# Patient Record
Sex: MALE | Race: BLACK OR AFRICAN AMERICAN | NOT HISPANIC OR LATINO | Employment: OTHER | URBAN - METROPOLITAN AREA
[De-identification: names, ages, dates, MRNs, and addresses within clinical notes are randomized per-mention and may not be internally consistent; named-entity substitution may affect disease eponyms.]

---

## 2023-09-07 ENCOUNTER — HOSPITAL ENCOUNTER (EMERGENCY)
Facility: HOSPITAL | Age: 78
Discharge: HOME | End: 2023-09-07
Attending: EMERGENCY MEDICINE
Payer: MEDICARE

## 2023-09-07 VITALS
SYSTOLIC BLOOD PRESSURE: 161 MMHG | HEIGHT: 69 IN | BODY MASS INDEX: 14.66 KG/M2 | RESPIRATION RATE: 18 BRPM | WEIGHT: 99 LBS | OXYGEN SATURATION: 94 % | HEART RATE: 64 BPM | DIASTOLIC BLOOD PRESSURE: 81 MMHG | TEMPERATURE: 97 F

## 2023-09-07 DIAGNOSIS — N39.0 URINARY TRACT INFECTION WITHOUT HEMATURIA, SITE UNSPECIFIED: Primary | ICD-10-CM

## 2023-09-07 LAB
APPEARANCE UR: CLEAR
BACTERIA #/AREA URNS AUTO: ABNORMAL /HPF
BILIRUB UR QL STRIP.AUTO: ABNORMAL
COLOR UR: YELLOW
GLUCOSE UR QL STRIP.AUTO: NEGATIVE
KETONES UR QL STRIP.AUTO: ABNORMAL
LEUKOCYTE ESTERASE UR QL STRIP.AUTO: ABNORMAL
MUCOUS THREADS URNS QL MICRO: ABNORMAL /LPF
NITRITE UR QL STRIP.AUTO: NEGATIVE
PH UR STRIP.AUTO: 5.5 [PH]
PROT UR QL STRIP.AUTO: ABNORMAL
RBC #/AREA URNS AUTO: ABNORMAL /HPF
RBC UR QL AUTO: NEGATIVE
SP GR UR STRIP.AUTO: 1.01 (ref 1–1.03)
SQUAMOUS #/AREA URNS AUTO: ABNORMAL /HPF
T VAGINALIS URNS QL MICRO: ABNORMAL /HPF
UROBILINOGEN UR STRIP-ACNC: 1
WBC #/AREA URNS AUTO: ABNORMAL /HPF

## 2023-09-07 PROCEDURE — 99283 EMERGENCY DEPT VISIT LOW MDM: CPT

## 2023-09-07 PROCEDURE — 81001 URINALYSIS AUTO W/SCOPE: CPT | Performed by: NURSE PRACTITIONER

## 2023-09-07 PROCEDURE — 87088 URINE BACTERIA CULTURE: CPT | Performed by: NURSE PRACTITIONER

## 2023-09-07 RX ORDER — NITROFURANTOIN 25; 75 MG/1; MG/1
100 CAPSULE ORAL 2 TIMES DAILY
Qty: 14 CAPSULE | Refills: 0 | Status: SHIPPED | OUTPATIENT
Start: 2023-09-07 | End: 2023-09-14

## 2023-09-07 NOTE — ED PROVIDER NOTES
Encounter Date: 9/7/2023       History     Chief Complaint   Patient presents with    Dysuria     Pt c/o dysuria x past few weeks, states was instructed to come to er per home health nurse.     Patient is a 77-year-old male presents emerged department complaints of burning with urination for the last week or so.  Denies any abdominal pain or back pain.  Denies any fevers chills.  Denies any nausea vomiting diarrhea.  Denies any symptoms of this in the past.  At this time is resting comfortably in results pending with no distress.  No other complaints or associated symptoms at this time.      Review of patient's allergies indicates:  No Known Allergies  No past medical history on file.  No past surgical history on file.  No family history on file.     Review of Systems   Constitutional:  Negative for activity change, appetite change and fever.   HENT:  Negative for congestion, dental problem and sore throat.    Eyes:  Negative for discharge.   Respiratory:  Negative for apnea, chest tightness and shortness of breath.    Cardiovascular:  Negative for chest pain.   Gastrointestinal:  Negative for abdominal distention, abdominal pain and nausea.   Genitourinary:  Positive for dysuria. Negative for decreased urine volume, difficulty urinating, testicular pain and urgency.   Musculoskeletal:  Negative for back pain.   Skin:  Negative for color change, rash and wound.   Neurological:  Negative for dizziness, facial asymmetry and weakness.   Hematological:  Does not bruise/bleed easily.   Psychiatric/Behavioral:  Negative for agitation and behavioral problems.    All other systems reviewed and are negative.      Physical Exam     Initial Vitals [09/07/23 1211]   BP Pulse Resp Temp SpO2   (!) 161/81 64 18 97.4 °F (36.3 °C) (!) 94 %      MAP       --         Physical Exam    Nursing note and vitals reviewed.  Constitutional: Vital signs are normal. He appears well-developed and well-nourished.  Non-toxic appearance. He does  not have a sickly appearance.   HENT:   Head: Normocephalic and atraumatic.   Right Ear: External ear normal.   Left Ear: External ear normal.   Eyes: Conjunctivae, EOM and lids are normal. Lids are everted and swept, no foreign bodies found.   Neck: Trachea normal and phonation normal. Neck supple. No thyroid mass and no thyromegaly present.   Normal range of motion.   Full passive range of motion without pain.     Cardiovascular:  Normal rate, regular rhythm, S1 normal, S2 normal, normal heart sounds, intact distal pulses and normal pulses.           Pulmonary/Chest: Breath sounds normal.   Abdominal: Abdomen is soft. There is no abdominal tenderness.   Musculoskeletal:      Cervical back: Full passive range of motion without pain, normal range of motion and neck supple.     Lymphadenopathy:     He has no cervical adenopathy.   Neurological: He is alert and oriented to person, place, and time. He has normal strength. GCS score is 15. GCS eye subscore is 4. GCS verbal subscore is 5. GCS motor subscore is 6.   Skin: Skin is warm, dry and intact. Capillary refill takes less than 2 seconds.   Psychiatric: He has a normal mood and affect. His speech is normal and behavior is normal. Judgment normal. Cognition and memory are normal.         ED Course   Procedures  Labs Reviewed   URINALYSIS, REFLEX TO URINE CULTURE - Abnormal; Notable for the following components:       Result Value    Protein, UA Trace (*)     Ketones, UA 1+ (*)     Bilirubin, UA 1+ (*)     Leukocyte Esterase, UA 1+ (*)     All other components within normal limits   URINALYSIS, MICROSCOPIC - Abnormal; Notable for the following components:    Mucous, UA Small (*)     Trichomonas, UA Few (*)     WBC, UA 11-20 (*)     Squamous Epithelial Cells, UA Few (*)     All other components within normal limits   CULTURE, URINE          Imaging Results    None          Medications - No data to display  Medical Decision Making  Patient is a 77-year-old male presents  emerged department complaints of burning with urination for the last week or so.  Denies any other complaints associated symptoms.    Problems Addressed:  Urinary tract infection without hematuria, site unspecified: acute illness or injury     Details: Urinalysis shows UTI here.  Discussed oral antibiotics on outpatient basis.  Discussed strict ED return precautions for any change or worsening symptoms in the next 2-3 days.  Patient had understanding of plan of care and had no other questions or concerns prior to discharge.    Amount and/or Complexity of Data Reviewed  External Data Reviewed: labs and notes.  Labs: ordered. Decision-making details documented in ED Course.    Risk  Prescription drug management.                               Clinical Impression:   Final diagnoses:  [N39.0] Urinary tract infection without hematuria, site unspecified (Primary)        ED Disposition Condition    Discharge Stable          ED Prescriptions       Medication Sig Dispense Start Date End Date Auth. Provider    nitrofurantoin, macrocrystal-monohydrate, (MACROBID) 100 MG capsule Take 1 capsule (100 mg total) by mouth 2 (two) times daily. for 7 days 14 capsule 9/7/2023 9/14/2023 Evan Beal FNP          Follow-up Information       Follow up With Specialties Details Why Contact Info    Jing Barlow FNP Family Medicine, Emergency Medicine Call  As needed, For ER Follow Up. [address] [place] [id number]  [phone]               Evan Beal FNP  09/07/23 3640       Evan Beal FNP  09/07/23 3057

## 2023-09-09 LAB — BACTERIA UR CULT: NORMAL

## 2023-09-18 ENCOUNTER — CLINICAL SUPPORT (OUTPATIENT)
Dept: RESPIRATORY THERAPY | Facility: HOSPITAL | Age: 78
End: 2023-09-18
Attending: NURSE PRACTITIONER
Payer: MEDICARE

## 2023-09-18 ENCOUNTER — HOSPITAL ENCOUNTER (OUTPATIENT)
Dept: RADIOLOGY | Facility: HOSPITAL | Age: 78
Discharge: HOME | End: 2023-09-18
Attending: NURSE PRACTITIONER
Payer: MEDICARE

## 2023-09-18 DIAGNOSIS — I10 ESSENTIAL HYPERTENSION, MALIGNANT: ICD-10-CM

## 2023-09-18 DIAGNOSIS — I10 ESSENTIAL HYPERTENSION, BENIGN: ICD-10-CM

## 2023-09-18 DIAGNOSIS — I10 ESSENTIAL HYPERTENSION, MALIGNANT: Primary | ICD-10-CM

## 2023-09-18 PROCEDURE — 93010 ELECTROCARDIOGRAM REPORT: CPT | Mod: ,,, | Performed by: STUDENT IN AN ORGANIZED HEALTH CARE EDUCATION/TRAINING PROGRAM

## 2023-09-18 PROCEDURE — 71046 X-RAY EXAM CHEST 2 VIEWS: CPT | Mod: TC

## 2023-09-18 PROCEDURE — 93010 EKG 12-LEAD: ICD-10-PCS | Mod: ,,, | Performed by: STUDENT IN AN ORGANIZED HEALTH CARE EDUCATION/TRAINING PROGRAM

## 2023-09-18 PROCEDURE — 93005 ELECTROCARDIOGRAM TRACING: CPT

## 2024-03-30 ENCOUNTER — HOSPITAL ENCOUNTER (EMERGENCY)
Facility: HOSPITAL | Age: 79
Discharge: HOME | End: 2024-03-30
Attending: INTERNAL MEDICINE
Payer: MEDICARE

## 2024-03-30 VITALS
WEIGHT: 98.88 LBS | BODY MASS INDEX: 14.6 KG/M2 | HEART RATE: 87 BPM | OXYGEN SATURATION: 98 % | RESPIRATION RATE: 15 BRPM | SYSTOLIC BLOOD PRESSURE: 103 MMHG | DIASTOLIC BLOOD PRESSURE: 76 MMHG

## 2024-03-30 DIAGNOSIS — F10.20 ALCOHOLISM: Primary | ICD-10-CM

## 2024-03-30 DIAGNOSIS — R79.89 ELEVATED LFTS: ICD-10-CM
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
## 2024-03-30 DIAGNOSIS — M54.9 BACK PAIN, UNSPECIFIED BACK LOCATION, UNSPECIFIED BACK PAIN LATERALITY, UNSPECIFIED CHRONICITY: ICD-10-CM

## 2024-03-30 DIAGNOSIS — E16.2 HYPOGLYCEMIA: ICD-10-CM

## 2024-03-30 DIAGNOSIS — R55 SYNCOPE: ICD-10-CM

## 2024-03-30 LAB
ALBUMIN SERPL-MCNC: 3.5 G/DL (ref 3.4–4.8)
ALBUMIN/GLOB SERPL: 0.9 RATIO (ref 1.1–2)
ALP SERPL-CCNC: 108 UNIT/L (ref 40–150)
ALT SERPL-CCNC: 350 UNIT/L (ref 0–55)
AMPHET UR QL SCN: NEGATIVE
APPEARANCE UR: CLEAR
APTT PPP: 31.1 SECONDS (ref 24.6–37.2)
AST SERPL-CCNC: 1278 UNIT/L (ref 5–34)
BACTERIA #/AREA URNS AUTO: ABNORMAL /HPF
BARBITURATE SCN PRESENT UR: NEGATIVE
BASOPHILS # BLD AUTO: 0.02 X10(3)/MCL
BASOPHILS NFR BLD AUTO: 0.2 %
BENZODIAZ UR QL SCN: NEGATIVE
BILIRUB SERPL-MCNC: 0.8 MG/DL
BILIRUB UR QL STRIP.AUTO: NEGATIVE
BNP BLD-MCNC: 201.1 PG/ML
BUN SERPL-MCNC: 29 MG/DL (ref 8.4–25.7)
CALCIUM SERPL-MCNC: 8.6 MG/DL (ref 8.8–10)
CANNABINOIDS UR QL SCN: NEGATIVE
CHLORIDE SERPL-SCNC: 105 MMOL/L (ref 98–107)
CK SERPL-CCNC: 163 U/L (ref 30–200)
CO2 SERPL-SCNC: 11 MMOL/L (ref 23–31)
COCAINE UR QL SCN: NEGATIVE
COLOR UR AUTO: YELLOW
CREAT SERPL-MCNC: 1.49 MG/DL (ref 0.73–1.18)
EOSINOPHIL # BLD AUTO: 0.03 X10(3)/MCL (ref 0–0.9)
EOSINOPHIL NFR BLD AUTO: 0.3 %
ERYTHROCYTE [DISTWIDTH] IN BLOOD BY AUTOMATED COUNT: 14.8 % (ref 11.5–17)
ETHANOL SERPL-MCNC: 32 MG/DL
FENTANYL UR QL SCN: NEGATIVE
GFR SERPLBLD CREATININE-BSD FMLA CKD-EPI: 48 MLS/MIN/1.73/M2
GLOBULIN SER-MCNC: 3.7 GM/DL (ref 2.4–3.5)
GLUCOSE SERPL-MCNC: 341 MG/DL (ref 82–115)
GLUCOSE UR QL STRIP.AUTO: ABNORMAL
HAV IGM SERPL QL IA: NONREACTIVE
HBV CORE IGM SERPL QL IA: NONREACTIVE
HBV SURFACE AG SERPL QL IA: NONREACTIVE
HCT VFR BLD AUTO: 35.1 % (ref 42–52)
HCV AB SERPL QL IA: NONREACTIVE
HGB BLD-MCNC: 10.7 G/DL (ref 14–18)
IMM GRANULOCYTES # BLD AUTO: 0.11 X10(3)/MCL (ref 0–0.04)
IMM GRANULOCYTES NFR BLD AUTO: 1.2 %
INR PPP: 1.2
KETONES UR QL STRIP.AUTO: ABNORMAL
LEUKOCYTE ESTERASE UR QL STRIP.AUTO: ABNORMAL
LIPASE SERPL-CCNC: 10 U/L
LYMPHOCYTES # BLD AUTO: 1.38 X10(3)/MCL (ref 0.6–4.6)
LYMPHOCYTES NFR BLD AUTO: 14.7 %
MCH RBC QN AUTO: 28.5 PG (ref 27–31)
MCHC RBC AUTO-ENTMCNC: 30.5 G/DL (ref 33–36)
MCV RBC AUTO: 93.6 FL (ref 80–94)
MDMA UR QL SCN: NEGATIVE
MONOCYTES # BLD AUTO: 0.46 X10(3)/MCL (ref 0.1–1.3)
MONOCYTES NFR BLD AUTO: 4.9 %
MUCOUS THREADS URNS QL MICRO: ABNORMAL /LPF
NEUTROPHILS # BLD AUTO: 7.39 X10(3)/MCL (ref 2.1–9.2)
NEUTROPHILS NFR BLD AUTO: 78.7 %
NITRITE UR QL STRIP.AUTO: NEGATIVE
OPIATES UR QL SCN: NEGATIVE
PCP UR QL: NEGATIVE
PH UR STRIP.AUTO: 5.5 [PH]
PH UR: 5.5 [PH] (ref 3–11)
PLATELET # BLD AUTO: 249 X10(3)/MCL (ref 130–400)
PMV BLD AUTO: 11.4 FL (ref 7.4–10.4)
POCT GLUCOSE: 21 MG/DL (ref 70–110)
POCT GLUCOSE: 357 MG/DL (ref 70–110)
POCT GLUCOSE: 373 MG/DL (ref 70–110)
POTASSIUM SERPL-SCNC: 4.5 MMOL/L (ref 3.5–5.1)
PROT SERPL-MCNC: 7.2 GM/DL (ref 5.8–7.6)
PROT UR QL STRIP.AUTO: ABNORMAL
PROTHROMBIN TIME: 15.5 SECONDS (ref 12.5–14.5)
RBC # BLD AUTO: 3.75 X10(6)/MCL (ref 4.7–6.1)
RBC #/AREA URNS AUTO: ABNORMAL /HPF
RBC UR QL AUTO: ABNORMAL
SODIUM SERPL-SCNC: 137 MMOL/L (ref 136–145)
SP GR UR STRIP.AUTO: 1.02 (ref 1–1.03)
SPECIFIC GRAVITY, URINE AUTO (.000) (OHS): 1.02 (ref 1–1.03)
SQUAMOUS #/AREA URNS AUTO: ABNORMAL /HPF
T VAGINALIS URNS QL MICRO: ABNORMAL /HPF
TROPONIN I SERPL-MCNC: <0.01 NG/ML (ref 0–0.04)
UROBILINOGEN UR STRIP-ACNC: 0.2
WBC # SPEC AUTO: 9.39 X10(3)/MCL (ref 4.5–11.5)
WBC #/AREA URNS AUTO: ABNORMAL /HPF

## 2024-03-30 PROCEDURE — 85610 PROTHROMBIN TIME: CPT | Performed by: INTERNAL MEDICINE

## 2024-03-30 PROCEDURE — 85730 THROMBOPLASTIN TIME PARTIAL: CPT | Performed by: INTERNAL MEDICINE

## 2024-03-30 PROCEDURE — 80074 ACUTE HEPATITIS PANEL: CPT | Performed by: INTERNAL MEDICINE

## 2024-03-30 PROCEDURE — 81001 URINALYSIS AUTO W/SCOPE: CPT | Performed by: INTERNAL MEDICINE

## 2024-03-30 PROCEDURE — 99285 EMERGENCY DEPT VISIT HI MDM: CPT | Mod: 25

## 2024-03-30 PROCEDURE — 82550 ASSAY OF CK (CPK): CPT | Performed by: INTERNAL MEDICINE

## 2024-03-30 PROCEDURE — 83690 ASSAY OF LIPASE: CPT | Performed by: INTERNAL MEDICINE

## 2024-03-30 PROCEDURE — 93010 ELECTROCARDIOGRAM REPORT: CPT | Mod: ,,, | Performed by: INTERNAL MEDICINE

## 2024-03-30 PROCEDURE — 82962 GLUCOSE BLOOD TEST: CPT

## 2024-03-30 PROCEDURE — 96365 THER/PROPH/DIAG IV INF INIT: CPT

## 2024-03-30 PROCEDURE — 87086 URINE CULTURE/COLONY COUNT: CPT | Performed by: INTERNAL MEDICINE

## 2024-03-30 PROCEDURE — 85025 COMPLETE CBC W/AUTO DIFF WBC: CPT | Performed by: INTERNAL MEDICINE

## 2024-03-30 PROCEDURE — 80053 COMPREHEN METABOLIC PANEL: CPT | Performed by: INTERNAL MEDICINE

## 2024-03-30 PROCEDURE — 25000003 PHARM REV CODE 250: Performed by: INTERNAL MEDICINE

## 2024-03-30 PROCEDURE — 82077 ASSAY SPEC XCP UR&BREATH IA: CPT | Performed by: INTERNAL MEDICINE

## 2024-03-30 PROCEDURE — 84484 ASSAY OF TROPONIN QUANT: CPT | Performed by: INTERNAL MEDICINE

## 2024-03-30 PROCEDURE — 80307 DRUG TEST PRSMV CHEM ANLYZR: CPT | Performed by: INTERNAL MEDICINE

## 2024-03-30 PROCEDURE — 93005 ELECTROCARDIOGRAM TRACING: CPT

## 2024-03-30 PROCEDURE — 83880 ASSAY OF NATRIURETIC PEPTIDE: CPT | Performed by: INTERNAL MEDICINE

## 2024-03-30 RX ORDER — NAPROXEN 375 MG/1
375 TABLET ORAL 2 TIMES DAILY WITH MEALS
Qty: 30 TABLET | Refills: 0 | Status: SHIPPED | OUTPATIENT
Start: 2024-03-30 | End: 2024-04-14

## 2024-03-30 RX ADMIN — DEXTROSE MONOHYDRATE 500 ML: 100 INJECTION, SOLUTION INTRAVENOUS at 11:03

## 2024-03-30 NOTE — ED PROVIDER NOTES
Encounter Date: 3/30/2024  History from patient and niece     History     Chief Complaint   Patient presents with    Fall     Pt found on bathroom floor after fall, cbg lo, pt possible on floor all night. Cbg<21 upon arrival, pt awake.     HPI    Joey Quintero is 78 y.o. male who  has a past medical history of Centrilobular emphysema, COPD (chronic obstructive pulmonary disease), Hypertension, and Pulmonary nodule, right (2019). arrives in ER with c/o Fall (Pt found on bathroom floor after fall, cbg lo, pt possible on floor all night. Cbg<21 upon arrival, pt awake.)    Review of patient's allergies indicates:  No Known Allergies  Past Medical History:   Diagnosis Date    Centrilobular emphysema     COPD (chronic obstructive pulmonary disease)     Hypertension     Pulmonary nodule, right 2019     History reviewed. No pertinent surgical history.  Family History   Problem Relation Age of Onset    Diabetes Mother     No Known Problems Father      Social History     Tobacco Use    Smoking status: Every Day     Types: Cigarettes    Smokeless tobacco: Never   Substance Use Topics    Alcohol use: Yes    Drug use: Never     Review of Systems   Unable to perform ROS: Other (Poor historian)   Constitutional:  Negative for fever.   Respiratory:  Negative for cough, chest tightness and shortness of breath.    Cardiovascular:  Negative for chest pain.   Gastrointestinal:  Negative for diarrhea and vomiting.   Musculoskeletal:  Positive for back pain.   Neurological:  Positive for syncope. Negative for dizziness.   Psychiatric/Behavioral: Negative.       Physical Exam     Initial Vitals   BP Pulse Resp Temp SpO2   03/30/24 1138 03/30/24 1138 03/30/24 1138 -- 03/30/24 1308   (!) 172/72 85 19  100 %      MAP       --                Physical Exam    Nursing note and vitals reviewed.  Constitutional:   Patient almost cachectic looking not in any distress lying down in the bed comfortably only complains of back pain   HENT:   Head:  Atraumatic.   Eyes: EOM are normal.   Neck: Neck supple.   Normal range of motion.  Cardiovascular:  Normal rate, normal heart sounds and intact distal pulses.           Pulmonary/Chest: Breath sounds normal. No respiratory distress. He has no wheezes. He has no rhonchi. He has no rales.   Abdominal: Abdomen is soft. He exhibits no distension. There is abdominal tenderness. There is no rebound and no guarding.   Musculoskeletal:      Cervical back: Normal range of motion and neck supple. No tenderness or bony tenderness.      Thoracic back: Tenderness present. No swelling, edema or bony tenderness.      Lumbar back: Tenderness present. No swelling, edema or bony tenderness.        Back:          ED Course   Procedures  Orders Placed This Encounter   Procedures    Urine culture    X-Ray Chest 1 View    X-Ray Thoracic Spine AP And Lateral    X-Ray Lumbar Spine Ap And Lateral    Comprehensive metabolic panel    CBC auto differential    Urinalysis, Reflex to Urine Culture    Troponin I    Lipase    Protime-INR    APTT    Brain natriuretic peptide    Ethanol    Drug Screen, Urine    CBC with Differential    CPK    Hepatitis Panel, Acute    Urinalysis, Microscopic    Pathologist Interpretation    EKG 12-lead     Medications   dextrose 10% bolus 500 mL 500 mL (0 mLs Intravenous Stopped 3/30/24 1212)     Admission on 03/30/2024, Discharged on 03/30/2024   Component Date Value Ref Range Status    Sodium Level 03/30/2024 137  136 - 145 mmol/L Final    Potassium Level 03/30/2024 4.5  3.5 - 5.1 mmol/L Final    Chloride 03/30/2024 105  98 - 107 mmol/L Final    Carbon Dioxide 03/30/2024 11 (L)  23 - 31 mmol/L Final    Glucose Level 03/30/2024 341 (H)  82 - 115 mg/dL Final    Blood Urea Nitrogen 03/30/2024 29.0 (H)  8.4 - 25.7 mg/dL Final    Creatinine 03/30/2024 1.49 (H)  0.73 - 1.18 mg/dL Final    Calcium Level Total 03/30/2024 8.6 (L)  8.8 - 10.0 mg/dL Final    Protein Total 03/30/2024 7.2  5.8 - 7.6 gm/dL Final    Albumin  Level 03/30/2024 3.5  3.4 - 4.8 g/dL Final    Globulin 03/30/2024 3.7 (H)  2.4 - 3.5 gm/dL Final    Albumin/Globulin Ratio 03/30/2024 0.9 (L)  1.1 - 2.0 ratio Final    Bilirubin Total 03/30/2024 0.8  <=1.5 mg/dL Final    Alkaline Phosphatase 03/30/2024 108  40 - 150 unit/L Final    Alanine Aminotransferase 03/30/2024 350 (H)  0 - 55 unit/L Final    Aspartate Aminotransferase 03/30/2024 1,278 (H)  5 - 34 unit/L Final    eGFR 03/30/2024 48  mls/min/1.73/m2 Final    Color, UA 03/30/2024 Yellow  Yellow, Light-Yellow, Dark Yellow, Clara, Straw Final    Appearance, UA 03/30/2024 Clear  Clear Final    Specific Gravity, UA 03/30/2024 1.020  1.005 - 1.030 Final    pH, UA 03/30/2024 5.5  5.0 - 8.5 Final    Protein, UA 03/30/2024 2+ (A)  Negative Final    Glucose, UA 03/30/2024 1+ (A)  Negative, Normal Final    Ketones, UA 03/30/2024 1+ (A)  Negative Final    Blood, UA 03/30/2024 2+ (A)  Negative Final    Bilirubin, UA 03/30/2024 Negative  Negative Final    Urobilinogen, UA 03/30/2024 0.2  0.2, 1.0, Normal Final    Nitrites, UA 03/30/2024 Negative  Negative Final    Leukocyte Esterase, UA 03/30/2024 Trace (A)  Negative Final    Troponin-I 03/30/2024 <0.010  0.000 - 0.045 ng/mL Final    QRS Duration 03/30/2024 86  ms Preliminary    OHS QTC Calculation 03/30/2024 453  ms Preliminary    Lipase Level 03/30/2024 10  <=60 U/L Final    PT 03/30/2024 15.5 (H)  12.5 - 14.5 seconds Final    INR 03/30/2024 1.2  <=1.3 Final    PTT 03/30/2024 31.1  24.6 - 37.2 seconds Final    Natriuretic Peptide 03/30/2024 201.1 (H)  <=100.0 pg/mL Final    Ethanol Level 03/30/2024 32.0 (H)  <=10.0 mg/dL Final    Amphetamines, Urine 03/30/2024 Negative  Negative Final    Barbituates, Urine 03/30/2024 Negative  Negative Final    Benzodiazepine, Urine 03/30/2024 Negative  Negative Final    Cannabinoids, Urine 03/30/2024 Negative  Negative Final    Cocaine, Urine 03/30/2024 Negative  Negative Final    Fentanyl, Urine 03/30/2024 Negative  Negative Final     MDMA, Urine 03/30/2024 Negative  Negative Final    Opiates, Urine 03/30/2024 Negative  Negative Final    Phencyclidine, Urine 03/30/2024 Negative  Negative Final    pH, Urine 03/30/2024 5.5  3.0 - 11.0 Final    Specific Gravity, Urine Auto 03/30/2024 1.020  1.001 - 1.035 Final    WBC 03/30/2024 9.39  4.50 - 11.50 x10(3)/mcL Final    RBC 03/30/2024 3.75 (L)  4.70 - 6.10 x10(6)/mcL Final    Hgb 03/30/2024 10.7 (L)  14.0 - 18.0 g/dL Final    Hct 03/30/2024 35.1 (L)  42.0 - 52.0 % Final    MCV 03/30/2024 93.6  80.0 - 94.0 fL Final    MCH 03/30/2024 28.5  27.0 - 31.0 pg Final    MCHC 03/30/2024 30.5 (L)  33.0 - 36.0 g/dL Final    RDW 03/30/2024 14.8  11.5 - 17.0 % Final    Platelet 03/30/2024 249  130 - 400 x10(3)/mcL Final    MPV 03/30/2024 11.4 (H)  7.4 - 10.4 fL Final    Neut % 03/30/2024 78.7  % Final    Lymph % 03/30/2024 14.7  % Final    Mono % 03/30/2024 4.9  % Final    Eos % 03/30/2024 0.3  % Final    Basophil % 03/30/2024 0.2  % Final    Lymph # 03/30/2024 1.38  0.6 - 4.6 x10(3)/mcL Final    Neut # 03/30/2024 7.39  2.1 - 9.2 x10(3)/mcL Final    Mono # 03/30/2024 0.46  0.1 - 1.3 x10(3)/mcL Final    Eos # 03/30/2024 0.03  0 - 0.9 x10(3)/mcL Final    Baso # 03/30/2024 0.02  <=0.2 x10(3)/mcL Final    IG# 03/30/2024 0.11 (H)  0 - 0.04 x10(3)/mcL Final    IG% 03/30/2024 1.2  % Final    POCT Glucose 03/30/2024 21 (LL)  70 - 110 mg/dL Final    Creatine Kinase 03/30/2024 163  30 - 200 U/L Final    POCT Glucose 03/30/2024 357 (H)  70 - 110 mg/dL Final    Hepatitis A IgM 03/30/2024 Nonreactive  Nonreactive Final    Hepatitis B Core IgM 03/30/2024 Nonreactive  Nonreactive Final    Hepatitis B Surface Antigen 03/30/2024 Nonreactive  Nonreactive Final    Hep C Ab Interp 03/30/2024 Nonreactive  Nonreactive Final    Bacteria, UA 03/30/2024 Few (A)  None Seen, Rare, Occasional /HPF Final    Mucous, UA 03/30/2024 Small (A)  None Seen /LPF Final    Trichomonas, UA 03/30/2024 Moderate (A)  None Seen /HPF Final    RBC, UA 03/30/2024  0-2  None Seen, 0-2, 3-5, 0-5 /HPF Final    WBC, UA 03/30/2024 11-20 (A)  None Seen, 0-2, 3-5, 0-5 /HPF Final    Squamous Epithelial Cells, UA 03/30/2024 Moderate (A)  None Seen, Rare, Occasional, Occ /HPF Final    POCT Glucose 03/30/2024 373 (H)  70 - 110 mg/dL Final         Labs Reviewed   COMPREHENSIVE METABOLIC PANEL - Abnormal; Notable for the following components:       Result Value    Carbon Dioxide 11 (*)     Glucose Level 341 (*)     Blood Urea Nitrogen 29.0 (*)     Creatinine 1.49 (*)     Calcium Level Total 8.6 (*)     Globulin 3.7 (*)     Albumin/Globulin Ratio 0.9 (*)     Alanine Aminotransferase 350 (*)     Aspartate Aminotransferase 1,278 (*)     All other components within normal limits   URINALYSIS, REFLEX TO URINE CULTURE - Abnormal; Notable for the following components:    Protein, UA 2+ (*)     Glucose, UA 1+ (*)     Ketones, UA 1+ (*)     Blood, UA 2+ (*)     Leukocyte Esterase, UA Trace (*)     All other components within normal limits   PROTIME-INR - Abnormal; Notable for the following components:    PT 15.5 (*)     All other components within normal limits   B-TYPE NATRIURETIC PEPTIDE - Abnormal; Notable for the following components:    Natriuretic Peptide 201.1 (*)     All other components within normal limits   ALCOHOL,MEDICAL (ETHANOL) - Abnormal; Notable for the following components:    Ethanol Level 32.0 (*)     All other components within normal limits   CBC WITH DIFFERENTIAL - Abnormal; Notable for the following components:    RBC 3.75 (*)     Hgb 10.7 (*)     Hct 35.1 (*)     MCHC 30.5 (*)     MPV 11.4 (*)     IG# 0.11 (*)     All other components within normal limits   URINALYSIS, MICROSCOPIC - Abnormal; Notable for the following components:    Bacteria, UA Few (*)     Mucous, UA Small (*)     Trichomonas, UA Moderate (*)     WBC, UA 11-20 (*)     Squamous Epithelial Cells, UA Moderate (*)     All other components within normal limits   POCT GLUCOSE - Abnormal; Notable for the  following components:    POCT Glucose 21 (*)     All other components within normal limits   POCT GLUCOSE - Abnormal; Notable for the following components:    POCT Glucose 357 (*)     All other components within normal limits   POCT GLUCOSE - Abnormal; Notable for the following components:    POCT Glucose 373 (*)     All other components within normal limits   TROPONIN I - Normal   LIPASE - Normal   APTT - Normal   DRUG SCREEN, URINE (BEAKER) - Normal    Narrative:     Cut off concentrations:    Amphetamines - 1000 ng/ml  Barbiturates - 200 ng/ml  Benzodiazepine - 200 ng/ml  Cannabinoids (THC) - 50 ng/ml  Cocaine - 300 ng/ml  Fentanyl - 1.0 ng/ml  MDMA - 500 ng/ml  Opiates - 300 ng/ml   Phencyclidine (PCP) - 25 ng/ml    Specimen submitted for drug analysis and tested for pH and specific gravity in order to evaluate sample integrity. Suspect tampering if specific gravity is <1.003 and/or pH is not within the range of 4.5 - 8.0  False negatives may result form substances such as bleach added to urine.  False positives may result for the presence of a substance with similar chemical structure to the drug or its metabolite.    This test provides only a PRELIMINARY analytical test result. A more specific alternate chemical method must be used in order to obtain a confirmed analytical result. Gas chromatography/mass spectrometry (GC/MS) is the preferred confirmatory method. Other chemical confirmation methods are available. Clinical consideration and professional judgement should be applied to any drug of abuse test result, particularly when preliminary positive results are used.    Positive results will be confirmed only at the physicians request. Unconfirmed screening results are to be used only for medical purposes (treatment).        CK - Normal   HEPATITIS PANEL, ACUTE - Normal   CULTURE, URINE   CBC W/ AUTO DIFFERENTIAL    Narrative:     The following orders were created for panel order CBC auto  differential.  Procedure                               Abnormality         Status                     ---------                               -----------         ------                     CBC with Differential[[id number]]       Abnormal            Final result                 Please view results for these tests on the individual orders.   PATHOLOGIST INTERPRETATION        ECG Results              EKG 12-lead (Preliminary result)        Collection Time Result Time QRS Duration OHS QTC Calculation    03/30/24 11:37:34 03/30/24 12:04:07 86 453                     Wet Read by Payton Ward MD (03/30/24 12:05:22, [facility] - Emergency Dept, Emergency Medicine)    EKG: Independently reviewed and / or Interpreted by Payton Ward MD. independently as Atrial fibrillation, rate 83, Right Axis Deviation, Q waves in anterior and septal leads, No STEMI, some tremor causing artifact in the baseline.                        In process by Interface, Lab In [facility] (03/30/24 11:52:17)                   Narrative:    Test Reason : R55,    Vent. Rate : 083 BPM     Atrial Rate : 100 BPM     P-R Int : 000 ms          QRS Dur : 086 ms      QT Int : 386 ms       P-R-T Axes : 000 093 079 degrees     QTc Int : 453 ms    Atrial fibrillation  Rightward axis  Anteroseptal infarct (cited on or before 18-SEP-2023)  Abnormal ECG  When compared with ECG of 18-SEP-2023 11:53,  Atrial fibrillation has replaced Sinus rhythm  Vent. rate has increased BY  29 BPM  Questionable change in initial forces of Anterior-lateral leads  ST now depressed in Inferior leads    Referred By: [email or web address]   SELF           Confirmed By:                                   Imaging Results              X-Ray Chest 1 View (Final result)  Result time 03/30/24 13:45:45      Final result by Albaro Bowers MD (03/30/24 13:45:45)                   Impression:      COPD changes.  No acute cardiopulmonary process.      Electronically signed by: Albaro Bowers  MD  Date:    03/30/2024  Time:    13:45               Narrative:    EXAMINATION:  Chest one view    CLINICAL HISTORY:  Fall    COMPARISON:  09/18/2023    FINDINGS:  There is lung hyperinflation and flattening of the diaphragms.  There is no confluent airspace disease.  There is no visible pneumothorax or pleural effusion.  There is no acute osseous finding.                                       X-Ray Thoracic Spine AP And Lateral (Final result)  Result time 03/30/24 13:48:58      Final result by Albaro Bowers MD (03/30/24 13:48:58)                   Impression:      No acute osseous abnormalities static listhesis.      Electronically signed by: Albaro Bowers MD  Date:    03/30/2024  Time:    13:48               Narrative:    EXAMINATION:  Thoracic spine two views    CLINICAL HISTORY:  Fall, injury    COMPARISON:  None    FINDINGS:  There is normal thoracic kyphosis.  Posterior thoracic alignment is maintained.  There is chronic appearing mild anterior wedging of midthoracic vertebra.  No osseous destruction.  Pedicles appear intact.  Mild thoracic spondylosis noted..                                       X-Ray Lumbar Spine Ap And Lateral (Final result)  Result time 03/30/24 13:51:35      Final result by Albaro Bowers MD (03/30/24 13:51:35)                   Impression:      Moderately advanced lumbar spondylosis.  No acute osseous finding or static listhesis.      Electronically signed by: Albaro Bowers MD  Date:    03/30/2024  Time:    13:51               Narrative:    EXAMINATION:  Lumbar spine three views    CLINICAL HISTORY:  Fall, injury, syncope    COMPARISON:  None    FINDINGS:  Vertebral body heights and posterior lumbar alignment appear maintained.  There is moderate multilevel facet arthropathy.  There is moderate to severe multilevel disc space narrowing, vertebral body spurs, and osteophytes evident.  There are vascular calcifications noted.  There is no acute fracture seen.  No osseous  destruction.                                       Medications   dextrose 10% bolus 500 mL 500 mL (0 mLs Intravenous Stopped 3/30/24 1212)     Medical Decision Making    Joey Quintero is 78 y.o. male who  has a past medical history of Centrilobular emphysema, COPD (chronic obstructive pulmonary disease), Hypertension, and Pulmonary nodule, right (2019). arrives in ER with c/o Fall (Pt found on bathroom floor after fall, cbg lo, pt possible on floor all night. Cbg<21 upon arrival, pt awake.)    Patient is brought to the emergency room by ambulance with complaint of fall at unknown time last night, patient was found on the floor where the family says that they have been trying to call him since last night he was not responding they went to check on him and was on the floor.  When medics went to pick him up they found his blood sugar to be so low that it could not be registered on the glucometer, they started him on D10 W drip and by the time he came to the emergency room we repeated the CBC and it was 21, but patient is awake and talking and tells me that he fell but he does not remember when he fell.  His only complaint is mid back pain denies any other pain anywhere else.      On examination patient does not have any deformities, but his skin and bones cachectic looking patient, only 2 medicines he is on a DuoNeb and calcium general is inhibitor combination antihypertensive pill.    I looked at his old chart and found that he has problem with hypertension, he also has a pulmonary nodule, he has centrilobular emphysema, and problem with alcoholism.    I am going to do a detailed workup on him including rule out rhabdomyolysis, we are giving him D10 W another 500 mL, and will monitor his blood sugar and decide once he gets the workup back.    Amount and/or Complexity of Data Reviewed  Labs: ordered. Decision-making details documented in ED Course.  Radiology: ordered.    Risk  Prescription drug management.  Risk  Details: ECHO from 2019  Technically difficult study.   Normal size left ventricle with evidence of mild concentric / eccentric   hypertrophy, no wall motion abnormality and an ejection fraction of 55% -   65%.   Mild tricuspid valve regurgitation with borderline elevated pulmonary   artery pressure (RVSP = 33 mm hg).   Normal aortic and mitral valves structures.   Normal central venous pressures (0 - 5 mm hg).   Normal right ventricular cavity size.   Trace pericardial effusion without any tamponade physiology.   No aortic root enlargement.                  ED Course as of 03/30/24 1746   Sat Mar 30, 2024   1210 POCT Glucose(!!): 21  CBC on arrival was 21, decided to give him 500 mL of D10 W bolus apart from the 250 mL D10 W he got from the ambulance. [GQ]   1407 Radiology techs informed me that they do not do liver ultrasounds in an emergency room, in this facility, as such it appears like to be a chronic problem with him, I will advise them to see his family doctor and the main thing is to stop drinking alcohol, but he has problem with chronic alcoholism I found the chart from 2019 also with a diagnosis of alcoholism on him, his main problem was fall and hypoglycemia his blood sugar is maintained. [GQ]   1413 Patient's blood sugar is maintained actually is 373, his alcohol level is 32, CPK is normal so he does not have any rhabdomyolysis going on and he does not have any injuries is only complaint was back pain but he does not have any fractures of the spine, I will let him go home with instruction to eat a balanced diet, try to stop drinking alcohol [GQ]   6610 I talked to patient's niece, and I have explained the workup to her, and she understands that is a problem with alcoholism and she understands that the things are going to get worse from here, and she is willing to take him home I have sent pain medicine for him.  And I have advised him that they must see the family doctor for further workup.  He has AFib  but the rate is controlled I am afraid to start him on any anticoagulants because of his problem with alcoholism and in case he falls and bleeds in the head.  Side will let his family doctor decide about that. [GQ]   3037 Patient's hepatitis panel is essentially negative, so he is having alcoholic hepatitis. [GQ]      ED Course User Index  [GQ] Payton Ward MD                           Clinical Impression:  Final diagnoses:  [R55] Syncope  [W19.XXXA] Accidental fall, initial encounter  [R79.89] Elevated LFTs  [F10.20] Alcoholism (Primary)  [M54.9] Back pain, unspecified back location, unspecified back pain laterality, unspecified chronicity  [E16.2] Hypoglycemia          ED Disposition Condition    Discharge Stable          ED Prescriptions       Medication Sig Dispense Start Date End Date Auth. Provider    naproxen (NAPROSYN) 375 MG tablet Take 1 tablet (375 mg total) by mouth 2 (two) times daily with meals. for 15 days 30 tablet 3/30/2024 4/14/2024 Payton Ward MD          Follow-up Information       Follow up With Specialties Details Why Contact Info    PMD  In 2 days               Payton Ward MD  03/30/24 5932       Payton Ward MD  03/30/24 9685       Payton Ward MD  03/30/24 7086

## 2024-03-30 NOTE — DISCHARGE INSTRUCTIONS
Your liver is getting affected by alcohol you must stop drinking alcohol as soon as possible, we are providing a list of resources available to help with quitting drinking.    Take pain medicine as prescribed        Take medicines as prescribed    See your family doctor in one to 2 days for further evaluation, workup, and treatment as necessary    Avoid driving or operating machinery while taking medicines as some medicines might cause drowsiness and may cause problems. Also pain medicines have potential of being addictive  so use Pain meds specially Narcotics Sparingly.    The exam and treatment you received in Emergency Room was for an urgent problem and NOT INTENDED AS COMPLETE CARE. It is important that you FOLLOW UP with a doctor for ongoing care. If your symptoms become WORSE or you DO NOT IMPROVE and you are unable to reach your health care provider, you should RETURN to the emergency department. The Emergency Room doctor has provided a PRELIMINARY INTERPRETATION of all your STUDIES. A final interpretation may be done after you are discharged. IF A CHANGE in your diagnosis or treatment is needed WE WILL CONTACT YOU. It is critical that we have a CURRENT PHONE NUMBER FOR YOU.      If you feel you need rehab and want to get help with abuse of substances, you can always seek help at the telephone numbers and addresses provided here.    [facility]  [address] [place]  [address]    [phone]    [facility]  [address]  [place], [place]      [facility]  [address] [place] [address]  [address]  [phone]    [facility]  [address] [place]  [address]  [phone]    [facility]  [address] [place]  [address]  [phone]    [facility]  [address] [place], [place] [address] 607  [phone]     [facility]  [phone]

## 2024-04-02 LAB
BACTERIA UR CULT: NO GROWTH
OHS QRS DURATION: 86 MS
OHS QTC CALCULATION: 453 MS
PATH REV: NORMAL

## 2024-05-13 DIAGNOSIS — R91.8 OTHER NONSPECIFIC ABNORMAL FINDING OF LUNG FIELD: Primary | ICD-10-CM

## 2024-05-13 DIAGNOSIS — J43.9 EMPHYSEMA, UNSPECIFIED: ICD-10-CM

## 2024-05-16 ENCOUNTER — HOSPITAL ENCOUNTER (OUTPATIENT)
Dept: PULMONOLOGY | Facility: HOSPITAL | Age: 79
Discharge: HOME | End: 2024-05-16
Attending: NURSE PRACTITIONER
Payer: MEDICARE

## 2024-05-16 ENCOUNTER — HOSPITAL ENCOUNTER (OUTPATIENT)
Dept: RADIOLOGY | Facility: HOSPITAL | Age: 79
Discharge: HOME | End: 2024-05-16
Attending: NURSE PRACTITIONER
Payer: MEDICARE

## 2024-05-16 VITALS — RESPIRATION RATE: 19 BRPM | OXYGEN SATURATION: 95 % | HEART RATE: 60 BPM

## 2024-05-16 DIAGNOSIS — R91.8 LUNG NODULES: ICD-10-CM

## 2024-05-16 DIAGNOSIS — J43.9 EMPHYSEMA, UNSPECIFIED: ICD-10-CM

## 2024-05-16 DIAGNOSIS — J43.9 EMPHYSEMA LUNG: ICD-10-CM

## 2024-05-16 DIAGNOSIS — R91.8 OTHER NONSPECIFIC ABNORMAL FINDING OF LUNG FIELD: ICD-10-CM

## 2024-05-16 PROCEDURE — 94060 EVALUATION OF WHEEZING: CPT

## 2024-05-16 PROCEDURE — 94640 AIRWAY INHALATION TREATMENT: CPT

## 2024-05-16 PROCEDURE — 94640 AIRWAY INHALATION TREATMENT: CPT | Mod: XB

## 2024-05-16 PROCEDURE — 94729 DIFFUSING CAPACITY: CPT

## 2024-05-16 PROCEDURE — 71046 X-RAY EXAM CHEST 2 VIEWS: CPT | Mod: TC

## 2024-05-16 PROCEDURE — 94726 PLETHYSMOGRAPHY LUNG VOLUMES: CPT

## 2024-05-16 RX ORDER — ALBUTEROL SULFATE 0.83 MG/ML
SOLUTION RESPIRATORY (INHALATION)
Status: DISCONTINUED
Start: 2024-05-16 | End: 2024-05-17 | Stop reason: HOSPADM

## 2024-05-16 RX ORDER — ALBUTEROL SULFATE 2.5 MG/.5ML
2.5 SOLUTION RESPIRATORY (INHALATION) EVERY 4 HOURS PRN
Status: DISPENSED | OUTPATIENT
Start: 2024-05-16

## 2024-05-16 NOTE — PROGRESS NOTES
Good effort given throughout testing. Patient had a persistent loose cough. Albuterol 2.5 mg given HR 60/64, SAT 95%, BBS CL  PFT completed

## 2024-05-20 LAB
DLCO SINGLE BREATH LLN: 18.03
DLCO SINGLE BREATH PRE REF: 27.5 %
DLCO SINGLE BREATH REF: 24.96
DLCOC SBVA LLN: 2.44
DLCOC SBVA REF: 3.61
DLCOC SINGLE BREATH LLN: 18.03
DLCOC SINGLE BREATH REF: 24.96
DLCOVA LLN: 2.44
DLCOVA PRE REF: 40.9 %
DLCOVA PRE: 1.47 ML/(MIN*MMHG*L) (ref 2.44–4.77)
DLCOVA REF: 3.61
ERV LLN: -16449.06
ERV PRE REF: 91.6 %
ERV REF: 0.94
FEF 25 75 CHG: -5.8 %
FEF 25 75 LLN: 0.57
FEF 25 75 POST REF: 31.2 %
FEF 25 75 PRE REF: 33.1 %
FEF 25 75 REF: 1.8
FET100 CHG: 4.5 %
FEV1 CHG: 7.4 %
FEV1 FVC CHG: 3.2 %
FEV1 FVC LLN: 62
FEV1 FVC POST REF: 63.6 %
FEV1 FVC PRE REF: 61.6 %
FEV1 FVC REF: 76
FEV1 LLN: 1.62
FEV1 POST REF: 50.9 %
FEV1 PRE REF: 47.4 %
FEV1 REF: 2.44
FRCPLETH LLN: 2.72
FRCPLETH PREREF: 131.5 %
FRCPLETH REF: 3.7
FVC CHG: 4 %
FVC LLN: 2.28
FVC POST REF: 79.6 %
FVC PRE REF: 76.5 %
FVC REF: 3.24
IVC PRE: 2.5 L (ref 2.28–4.22)
IVC SINGLE BREATH LLN: 2.28
IVC SINGLE BREATH PRE REF: 77 %
IVC SINGLE BREATH REF: 3.24
MVV LLN: 96
MVV PRE REF: 31.5 %
MVV REF: 113
PEF CHG: 23.9 %
PEF LLN: 4.9
PEF POST REF: 21.7 %
PEF PRE REF: 17.5 %
PEF REF: 7.48
POST FEF 25 75: 0.56 L/S (ref 0.57–3.73)
POST FET 100: 5.87 SEC
POST FEV1 FVC: 48.15 % (ref 61.71–88.37)
POST FEV1: 1.24 L (ref 1.62–3.2)
POST FVC: 2.58 L (ref 2.28–4.22)
POST PEF: 1.62 L/S (ref 4.9–10.06)
PRE DLCO: 6.85 ML/(MIN*MMHG) (ref 18.03–31.89)
PRE ERV: 0.86 L (ref -16449.06–16450.94)
PRE FEF 25 75: 0.6 L/S (ref 0.57–3.73)
PRE FET 100: 5.62 SEC
PRE FEV1 FVC: 46.63 % (ref 61.71–88.37)
PRE FEV1: 1.16 L (ref 1.62–3.2)
PRE FRC PL: 4.87 L (ref 2.72–4.69)
PRE FVC: 2.48 L (ref 2.28–4.22)
PRE MVV: 35.46 L/MIN (ref 95.65–129.41)
PRE PEF: 1.31 L/S (ref 4.9–10.06)
PRE RV: 4 L (ref 2.09–3.43)
PRE TLC: 6.49 L (ref 5.77–8.07)
RAW LLN: 3.06
RAW PRE REF: 129.5 %
RAW PRE: 3.96 CMH2O*S/L (ref 3.06–3.06)
RAW REF: 3.06
RV LLN: 2.09
RV PRE REF: 145.1 %
RV REF: 2.76
RVTLC LLN: 35
RVTLC PRE REF: 140.4 %
RVTLC PRE: 61.75 % (ref 35.01–52.97)
RVTLC REF: 44
TLC LLN: 5.77
TLC PRE REF: 93.7 %
TLC REF: 6.92
VA PRE: 4.65 L (ref 6.77–6.77)
VA SINGLE BREATH LLN: 6.77
VA SINGLE BREATH PRE REF: 68.6 %
VA SINGLE BREATH REF: 6.77
VC LLN: 2.28
VC PRE REF: 76.5 %
VC PRE: 2.48 L (ref 2.28–4.22)
VC REF: 3.24

## 2024-09-28 ENCOUNTER — HOSPITAL ENCOUNTER (EMERGENCY)
Facility: HOSPITAL | Age: 79
Discharge: HOME | End: 2024-09-29
Attending: INTERNAL MEDICINE
Payer: MEDICARE

## 2024-09-28 DIAGNOSIS — E87.20 LACTIC ACID ACIDOSIS: ICD-10-CM

## 2024-09-28 DIAGNOSIS — R00.1 BRADYCARDIA: ICD-10-CM

## 2024-09-28 DIAGNOSIS — I48.91 ATRIAL FIBRILLATION, UNSPECIFIED TYPE: ICD-10-CM

## 2024-09-28 DIAGNOSIS — E16.2 HYPOGLYCEMIA: ICD-10-CM

## 2024-09-28 DIAGNOSIS — E86.0 DEHYDRATION: Primary | ICD-10-CM

## 2024-09-28 LAB
ALBUMIN SERPL-MCNC: 3.8 G/DL (ref 3.4–4.8)
ALBUMIN/GLOB SERPL: 0.9 RATIO (ref 1.1–2)
ALLENS TEST: ABNORMAL
ALP SERPL-CCNC: 92 UNIT/L (ref 40–150)
ALT SERPL-CCNC: 11 UNIT/L (ref 0–55)
AMPHET UR QL SCN: NEGATIVE
ANION GAP SERPL CALC-SCNC: 22 MEQ/L
AST SERPL-CCNC: 31 UNIT/L (ref 5–34)
BACTERIA #/AREA URNS AUTO: ABNORMAL /HPF
BARBITURATE SCN PRESENT UR: NEGATIVE
BASOPHILS # BLD AUTO: 0.02 X10(3)/MCL
BASOPHILS NFR BLD AUTO: 0.1 %
BENZODIAZ UR QL SCN: NEGATIVE
BILIRUB SERPL-MCNC: 0.6 MG/DL
BILIRUB UR QL STRIP.AUTO: NEGATIVE
BNP BLD-MCNC: 270.6 PG/ML
BUN SERPL-MCNC: 21 MG/DL (ref 8.4–25.7)
CALCIUM SERPL-MCNC: 9 MG/DL (ref 8.8–10)
CANNABINOIDS UR QL SCN: NEGATIVE
CHLORIDE SERPL-SCNC: 103 MMOL/L (ref 98–107)
CK SERPL-CCNC: 121 U/L (ref 30–200)
CLARITY UR: CLEAR
CO2 SERPL-SCNC: 16 MMOL/L (ref 23–31)
COCAINE UR QL SCN: NEGATIVE
COLOR UR AUTO: YELLOW
CREAT SERPL-MCNC: 1.43 MG/DL (ref 0.73–1.18)
CREAT/UREA NIT SERPL: 15
DELSYS: ABNORMAL
EOSINOPHIL # BLD AUTO: 0.03 X10(3)/MCL (ref 0–0.9)
EOSINOPHIL NFR BLD AUTO: 0.2 %
ERYTHROCYTE [DISTWIDTH] IN BLOOD BY AUTOMATED COUNT: 14.2 % (ref 11.5–17)
ETHANOL SERPL-MCNC: <10 MG/DL
FENTANYL UR QL SCN: NEGATIVE
GFR SERPLBLD CREATININE-BSD FMLA CKD-EPI: 50 ML/MIN/1.73/M2
GLOBULIN SER-MCNC: 4.1 GM/DL (ref 2.4–3.5)
GLUCOSE SERPL-MCNC: 286 MG/DL (ref 82–115)
GLUCOSE UR QL STRIP: ABNORMAL
HCO3 UR-SCNC: 16.6 MMOL/L (ref 24–28)
HCT VFR BLD AUTO: 38.3 % (ref 42–52)
HGB BLD-MCNC: 11.7 G/DL (ref 14–18)
HGB UR QL STRIP: NEGATIVE
IMM GRANULOCYTES # BLD AUTO: 0.07 X10(3)/MCL (ref 0–0.04)
IMM GRANULOCYTES NFR BLD AUTO: 0.5 %
KETONES UR QL STRIP: ABNORMAL
LACTATE SERPL-SCNC: 10.8 MMOL/L (ref 0.5–2.2)
LACTATE SERPL-SCNC: 2.8 MMOL/L (ref 0.5–2.2)
LEUKOCYTE ESTERASE UR QL STRIP: NEGATIVE
LYMPHOCYTES # BLD AUTO: 1.27 X10(3)/MCL (ref 0.6–4.6)
LYMPHOCYTES NFR BLD AUTO: 8.6 %
MAGNESIUM SERPL-MCNC: 2.3 MG/DL (ref 1.6–2.6)
MCH RBC QN AUTO: 28.6 PG (ref 27–31)
MCHC RBC AUTO-ENTMCNC: 30.5 G/DL (ref 33–36)
MCV RBC AUTO: 93.6 FL (ref 80–94)
MDMA UR QL SCN: NEGATIVE
MONOCYTES # BLD AUTO: 0.46 X10(3)/MCL (ref 0.1–1.3)
MONOCYTES NFR BLD AUTO: 3.1 %
NEUTROPHILS # BLD AUTO: 12.93 X10(3)/MCL (ref 2.1–9.2)
NEUTROPHILS NFR BLD AUTO: 87.5 %
NITRITE UR QL STRIP: NEGATIVE
OPIATES UR QL SCN: NEGATIVE
PCO2 BLDA: 42.2 MMHG (ref 35–45)
PCP UR QL: NEGATIVE
PH SMN: 7.2 [PH] (ref 7.35–7.45)
PH UR STRIP: 5.5 [PH]
PH UR: 5.5 [PH] (ref 3–11)
PLATELET # BLD AUTO: 180 X10(3)/MCL (ref 130–400)
PMV BLD AUTO: 11.1 FL (ref 7.4–10.4)
PO2 BLDA: 240 MMHG (ref 80–100)
POC BE: -11 MMOL/L
POC SATURATED O2: 100 % (ref 95–100)
POC TCO2: 18 MMOL/L (ref 23–27)
POCT GLUCOSE: 256 MG/DL (ref 70–110)
POCT GLUCOSE: 33 MG/DL (ref 70–110)
POTASSIUM SERPL-SCNC: 3.7 MMOL/L (ref 3.5–5.1)
PROT SERPL-MCNC: 7.9 GM/DL (ref 5.8–7.6)
PROT UR QL STRIP: ABNORMAL
RBC # BLD AUTO: 4.09 X10(6)/MCL (ref 4.7–6.1)
RBC #/AREA URNS AUTO: ABNORMAL /HPF
SAMPLE: ABNORMAL
SITE: ABNORMAL
SODIUM SERPL-SCNC: 141 MMOL/L (ref 136–145)
SP GR UR STRIP.AUTO: 1.01 (ref 1–1.03)
SPECIFIC GRAVITY, URINE AUTO (.000) (OHS): 1.01 (ref 1–1.03)
SQUAMOUS #/AREA URNS AUTO: ABNORMAL /HPF
TRICHOMONAS UR QL COMP ASSIST: ABNORMAL /HPF
TROPONIN I SERPL-MCNC: <0.01 NG/ML (ref 0–0.04)
UROBILINOGEN UR STRIP-ACNC: 0.2
WBC # BLD AUTO: 14.78 X10(3)/MCL (ref 4.5–11.5)
WBC #/AREA URNS AUTO: ABNORMAL /HPF

## 2024-09-28 PROCEDURE — 63600175 PHARM REV CODE 636 W HCPCS: Performed by: INTERNAL MEDICINE

## 2024-09-28 PROCEDURE — 82077 ASSAY SPEC XCP UR&BREATH IA: CPT | Performed by: INTERNAL MEDICINE

## 2024-09-28 PROCEDURE — 82962 GLUCOSE BLOOD TEST: CPT

## 2024-09-28 PROCEDURE — 81001 URINALYSIS AUTO W/SCOPE: CPT | Performed by: INTERNAL MEDICINE

## 2024-09-28 PROCEDURE — 83605 ASSAY OF LACTIC ACID: CPT | Performed by: INTERNAL MEDICINE

## 2024-09-28 PROCEDURE — 87040 BLOOD CULTURE FOR BACTERIA: CPT | Performed by: INTERNAL MEDICINE

## 2024-09-28 PROCEDURE — 93005 ELECTROCARDIOGRAM TRACING: CPT

## 2024-09-28 PROCEDURE — 84484 ASSAY OF TROPONIN QUANT: CPT | Performed by: INTERNAL MEDICINE

## 2024-09-28 PROCEDURE — 96361 HYDRATE IV INFUSION ADD-ON: CPT

## 2024-09-28 PROCEDURE — 83735 ASSAY OF MAGNESIUM: CPT | Performed by: INTERNAL MEDICINE

## 2024-09-28 PROCEDURE — 85025 COMPLETE CBC W/AUTO DIFF WBC: CPT | Performed by: INTERNAL MEDICINE

## 2024-09-28 PROCEDURE — 96374 THER/PROPH/DIAG INJ IV PUSH: CPT

## 2024-09-28 PROCEDURE — 99291 CRITICAL CARE FIRST HOUR: CPT

## 2024-09-28 PROCEDURE — 99900035 HC TECH TIME PER 15 MIN (STAT)

## 2024-09-28 PROCEDURE — 36600 WITHDRAWAL OF ARTERIAL BLOOD: CPT

## 2024-09-28 PROCEDURE — 80053 COMPREHEN METABOLIC PANEL: CPT | Performed by: INTERNAL MEDICINE

## 2024-09-28 PROCEDURE — 82803 BLOOD GASES ANY COMBINATION: CPT

## 2024-09-28 PROCEDURE — 25000003 PHARM REV CODE 250

## 2024-09-28 PROCEDURE — 81003 URINALYSIS AUTO W/O SCOPE: CPT | Performed by: INTERNAL MEDICINE

## 2024-09-28 PROCEDURE — 82550 ASSAY OF CK (CPK): CPT | Performed by: INTERNAL MEDICINE

## 2024-09-28 PROCEDURE — 93010 ELECTROCARDIOGRAM REPORT: CPT | Mod: ,,, | Performed by: INTERNAL MEDICINE

## 2024-09-28 PROCEDURE — 83880 ASSAY OF NATRIURETIC PEPTIDE: CPT | Performed by: INTERNAL MEDICINE

## 2024-09-28 PROCEDURE — 25000003 PHARM REV CODE 250: Performed by: INTERNAL MEDICINE

## 2024-09-28 PROCEDURE — 80307 DRUG TEST PRSMV CHEM ANLYZR: CPT | Performed by: INTERNAL MEDICINE

## 2024-09-28 RX ORDER — SODIUM CHLORIDE 9 MG/ML
1000 INJECTION, SOLUTION INTRAVENOUS
Status: DISCONTINUED | OUTPATIENT
Start: 2024-09-28 | End: 2024-09-29 | Stop reason: HOSPADM

## 2024-09-28 RX ORDER — DEXTROSE MONOHYDRATE AND SODIUM CHLORIDE 5; .9 G/100ML; G/100ML
1000 INJECTION, SOLUTION INTRAVENOUS
Status: COMPLETED | OUTPATIENT
Start: 2024-09-28 | End: 2024-09-28

## 2024-09-28 RX ADMIN — DEXTROSE AND SODIUM CHLORIDE 1000 ML: 5; 900 INJECTION, SOLUTION INTRAVENOUS at 08:09

## 2024-09-28 RX ADMIN — DEXTROSE MONOHYDRATE 50 ML: 25 INJECTION, SOLUTION INTRAVENOUS at 08:09

## 2024-09-28 RX ADMIN — SODIUM CHLORIDE, POTASSIUM CHLORIDE, SODIUM LACTATE AND CALCIUM CHLORIDE 1000 ML: 600; 310; 30; 20 INJECTION, SOLUTION INTRAVENOUS at 09:09

## 2024-09-28 RX ADMIN — SODIUM CHLORIDE 1089 ML: 9 INJECTION, SOLUTION INTRAVENOUS at 10:09

## 2024-09-29 VITALS
HEART RATE: 90 BPM | BODY MASS INDEX: 11.81 KG/M2 | DIASTOLIC BLOOD PRESSURE: 76 MMHG | TEMPERATURE: 99 F | SYSTOLIC BLOOD PRESSURE: 135 MMHG | RESPIRATION RATE: 19 BRPM | OXYGEN SATURATION: 99 % | WEIGHT: 80 LBS

## 2024-09-29 LAB
LACTATE SERPL-SCNC: 2.6 MMOL/L (ref 0.5–2.2)
OHS QRS DURATION: 92 MS
OHS QTC CALCULATION: 511 MS

## 2024-09-29 PROCEDURE — 96375 TX/PRO/DX INJ NEW DRUG ADDON: CPT

## 2024-09-29 PROCEDURE — 25000003 PHARM REV CODE 250: Performed by: INTERNAL MEDICINE

## 2024-09-29 PROCEDURE — 83605 ASSAY OF LACTIC ACID: CPT | Performed by: INTERNAL MEDICINE

## 2024-09-29 RX ORDER — METOPROLOL TARTRATE 1 MG/ML
5 INJECTION, SOLUTION INTRAVENOUS
Status: COMPLETED | OUTPATIENT
Start: 2024-09-29 | End: 2024-09-29

## 2024-09-29 RX ORDER — METOPROLOL TARTRATE 50 MG/1
50 TABLET ORAL
Status: COMPLETED | OUTPATIENT
Start: 2024-09-29 | End: 2024-09-29

## 2024-09-29 RX ADMIN — METOPROLOL TARTRATE 5 MG: 1 INJECTION, SOLUTION INTRAVENOUS at 01:09

## 2024-09-29 RX ADMIN — METOPROLOL TARTRATE 50 MG: 50 TABLET, FILM COATED ORAL at 12:09

## 2024-09-29 NOTE — ED PROVIDER NOTES
Encounter Date: 9/28/2024       History     Chief Complaint   Patient presents with    Hypoglycemia     Per ems pt had unwitnessed fall tonight. Difficult to arouse on arrival. Cbg 30.      78-year-old black male presents emergency department via EMS after life Alert went off.  Upon arrival patient was blood sugar was in the 30s so he was given an amp of D50 and he was hypothermic he was placed on a Miah Hugger      Review of patient's allergies indicates:  No Known Allergies  Past Medical History:   Diagnosis Date    Centrilobular emphysema     COPD (chronic obstructive pulmonary disease)     Hypertension     Pulmonary nodule, right 2019     No past surgical history on file.  Family History   Problem Relation Name Age of Onset    Diabetes Mother      No Known Problems Father       Social History     Tobacco Use    Smoking status: Every Day     Types: Cigarettes    Smokeless tobacco: Never   Substance Use Topics    Alcohol use: Yes    Drug use: Never     Review of Systems   Unable to perform ROS: Acuity of condition       Physical Exam     Initial Vitals   BP Pulse Resp Temp SpO2   09/28/24 2030 09/28/24 2030 09/28/24 2030 09/28/24 2054 09/28/24 2054   (!) 189/107 (!) 128 (!) 23 (!) 91.8 °F (33.2 °C) (!) 89 %      MAP       --                Physical Exam    Constitutional: He appears well-developed.   Muscle wasting and cachectic   HENT:   Head: Normocephalic and atraumatic.   Dry mucous membranes   Eyes: EOM are normal.   Neck: Neck supple.   Cardiovascular:            Tachycardic and irregular   Pulmonary/Chest:   Poor air movement bilaterally no wheezes heard   Abdominal: Abdomen is soft. Bowel sounds are normal.   Musculoskeletal:         General: Normal range of motion.      Cervical back: Neck supple.     Neurological: GCS eye subscore is 3. GCS verbal subscore is 4. GCS motor subscore is 4.   Once patient was given D50 his GCS went to 15   Skin:   Poor skin turgor, cold         ED Course   Critical  Care    Date/Time: 9/29/2024 3:01 AM    Performed by: Ryan Whitney MD  Authorized by: Ryan Whitney MD  Direct patient critical care time: 23 minutes  Additional history critical care time: 13 minutes  Ordering / reviewing critical care time: 11 minutes  Documentation critical care time: 12 minutes  Consult with family critical care time: 6 minutes  Total critical care time (exclusive of procedural time) : 65 minutes  Critical care time was exclusive of separately billable procedures and treating other patients.  Critical care was necessary to treat or prevent imminent or life-threatening deterioration of the following conditions: cardiac failure, dehydration, circulatory failure, metabolic crisis, endocrine crisis and CNS failure or compromise.  Critical care was time spent personally by me on the following activities: blood draw for specimens, development of treatment plan with patient or surrogate, interpretation of cardiac output measurements, evaluation of patient's response to treatment, examination of patient, obtaining history from patient or surrogate, ordering and performing treatments and interventions, ordering and review of laboratory studies, ordering and review of radiographic studies, pulse oximetry, re-evaluation of patient's condition and review of old charts.          Admission on 09/28/2024   Component Date Value Ref Range Status    Sodium 09/28/2024 141  136 - 145 mmol/L Final    Potassium 09/28/2024 3.7  3.5 - 5.1 mmol/L Final    Chloride 09/28/2024 103  98 - 107 mmol/L Final    CO2 09/28/2024 16 (L)  23 - 31 mmol/L Final    Glucose 09/28/2024 286 (H)  82 - 115 mg/dL Final    Blood Urea Nitrogen 09/28/2024 21.0  8.4 - 25.7 mg/dL Final    Creatinine 09/28/2024 1.43 (H)  0.73 - 1.18 mg/dL Final    Calcium 09/28/2024 9.0  8.8 - 10.0 mg/dL Final    Protein Total 09/28/2024 7.9 (H)  5.8 - 7.6 gm/dL Final    Albumin 09/28/2024 3.8  3.4 - 4.8 g/dL Final    Globulin 09/28/2024 4.1 (H)   2.4 - 3.5 gm/dL Final    Albumin/Globulin Ratio 09/28/2024 0.9 (L)  1.1 - 2.0 ratio Final    Bilirubin Total 09/28/2024 0.6  <=1.5 mg/dL Final    ALP 09/28/2024 92  40 - 150 unit/L Final    ALT 09/28/2024 11  0 - 55 unit/L Final    AST 09/28/2024 31  5 - 34 unit/L Final    eGFR 09/28/2024 50  mL/min/1.73/m2 Final    Anion Gap 09/28/2024 22.0  mEq/L Final    BUN/Creatinine Ratio 09/28/2024 15   Final    Color, UA 09/28/2024 Yellow  Yellow, Light-Yellow, Dark Yellow, Clara, Straw Final    Appearance, UA 09/28/2024 Clear  Clear Final    Specific Gravity, UA 09/28/2024 1.015  1.005 - 1.030 Final    pH, UA 09/28/2024 5.5  5.0 - 8.5 Final    Protein, UA 09/28/2024 Trace (A)  Negative Final    Glucose, UA 09/28/2024 2+ (A)  Negative, Normal Final    Ketones, UA 09/28/2024 2+ (A)  Negative Final    Blood, UA 09/28/2024 Negative  Negative Final    Bilirubin, UA 09/28/2024 Negative  Negative Final    Urobilinogen, UA 09/28/2024 0.2  0.2, 1.0, Normal Final    Nitrites, UA 09/28/2024 Negative  Negative Final    Leukocyte Esterase, UA 09/28/2024 Negative  Negative Final    Amphetamines, Urine 09/28/2024 Negative  Negative Final    Barbiturates, Urine 09/28/2024 Negative  Negative Final    Benzodiazepine, Urine 09/28/2024 Negative  Negative Final    Cannabinoids, Urine 09/28/2024 Negative  Negative Final    Cocaine, Urine 09/28/2024 Negative  Negative Final    Fentanyl, Urine 09/28/2024 Negative  Negative Final    MDMA, Urine 09/28/2024 Negative  Negative Final    Opiates, Urine 09/28/2024 Negative  Negative Final    Phencyclidine, Urine 09/28/2024 Negative  Negative Final    pH, Urine 09/28/2024 5.5  3.0 - 11.0 Final    Specific Gravity, Urine Auto 09/28/2024 1.015  1.001 - 1.035 Final    Ethanol Level 09/28/2024 <10.0  <=10.0 mg/dL Final    Lactic Acid Level 09/28/2024 10.8 (HH)  0.5 - 2.2 mmol/L Final    Troponin-I 09/28/2024 <0.010  0.000 - 0.045 ng/mL Final    Natriuretic Peptide 09/28/2024 270.6 (H)  <=100.0 pg/mL Final     Magnesium Level 09/28/2024 2.30  1.60 - 2.60 mg/dL Final    WBC 09/28/2024 14.78 (H)  4.50 - 11.50 x10(3)/mcL Final    RBC 09/28/2024 4.09 (L)  4.70 - 6.10 x10(6)/mcL Final    Hgb 09/28/2024 11.7 (L)  14.0 - 18.0 g/dL Final    Hct 09/28/2024 38.3 (L)  42.0 - 52.0 % Final    MCV 09/28/2024 93.6  80.0 - 94.0 fL Final    MCH 09/28/2024 28.6  27.0 - 31.0 pg Final    MCHC 09/28/2024 30.5 (L)  33.0 - 36.0 g/dL Final    RDW 09/28/2024 14.2  11.5 - 17.0 % Final    Platelet 09/28/2024 180  130 - 400 x10(3)/mcL Final    MPV 09/28/2024 11.1 (H)  7.4 - 10.4 fL Final    Neut % 09/28/2024 87.5  % Final    Lymph % 09/28/2024 8.6  % Final    Mono % 09/28/2024 3.1  % Final    Eos % 09/28/2024 0.2  % Final    Basophil % 09/28/2024 0.1  % Final    Lymph # 09/28/2024 1.27  0.6 - 4.6 x10(3)/mcL Final    Neut # 09/28/2024 12.93 (H)  2.1 - 9.2 x10(3)/mcL Final    Mono # 09/28/2024 0.46  0.1 - 1.3 x10(3)/mcL Final    Eos # 09/28/2024 0.03  0 - 0.9 x10(3)/mcL Final    Baso # 09/28/2024 0.02  <=0.2 x10(3)/mcL Final    IG# 09/28/2024 0.07 (H)  0 - 0.04 x10(3)/mcL Final    IG% 09/28/2024 0.5  % Final    POCT Glucose 09/28/2024 33 (LL)  70 - 110 mg/dL Final    Creatine Kinase 09/28/2024 121  30 - 200 U/L Final    POC PH 09/28/2024 7.203 (LL)  7.35 - 7.45 Final    POC PCO2 09/28/2024 42.2  35 - 45 mmHg Final    POC PO2 09/28/2024 240 (H)  80 - 100 mmHg Final    POC HCO3 09/28/2024 16.6 (L)  24 - 28 mmol/L Final    POC BE 09/28/2024 -11 (L)  -2 to 2 mmol/L Final    POC SATURATED O2 09/28/2024 100  95 - 100 % Final    POC TCO2 09/28/2024 18 (L)  23 - 27 mmol/L Final    Sample 09/28/2024 ARTERIAL   Final    Site 09/28/2024 RB   Final    Allens Test 09/28/2024 N/A   Final    DelSys 09/28/2024 Venti Mask   Final    POCT Glucose 09/28/2024 256 (H)  70 - 110 mg/dL Final    Bacteria, UA 09/28/2024 Rare  None Seen, Rare, Occasional /HPF Final    Trichomonas, UA 09/28/2024 Few (A)  None Seen /HPF Final    RBC, UA 09/28/2024 0-2  None Seen, 0-2, 3-5, 0-5  /HPF Final    WBC, UA 09/28/2024 0-2  None Seen, 0-2, 3-5, 0-5 /HPF Final    Squamous Epithelial Cells, UA 09/28/2024 Few (A)  None Seen, Rare, Occasional, Occ /HPF Final    Lactic Acid Level 09/28/2024 2.8 (H)  0.5 - 2.2 mmol/L Final    Lactic Acid Level 09/29/2024 2.6 (H)  0.5 - 2.2 mmol/L Final       Labs Reviewed   COMPREHENSIVE METABOLIC PANEL - Abnormal       Result Value    Sodium 141      Potassium 3.7      Chloride 103      CO2 16 (*)     Glucose 286 (*)     Blood Urea Nitrogen 21.0      Creatinine 1.43 (*)     Calcium 9.0      Protein Total 7.9 (*)     Albumin 3.8      Globulin 4.1 (*)     Albumin/Globulin Ratio 0.9 (*)     Bilirubin Total 0.6      ALP 92      ALT 11      AST 31      eGFR 50      Anion Gap 22.0      BUN/Creatinine Ratio 15     URINALYSIS, REFLEX TO URINE CULTURE - Abnormal    Color, UA Yellow      Appearance, UA Clear      Specific Gravity, UA 1.015      pH, UA 5.5      Protein, UA Trace (*)     Glucose, UA 2+ (*)     Ketones, UA 2+ (*)     Blood, UA Negative      Bilirubin, UA Negative      Urobilinogen, UA 0.2      Nitrites, UA Negative      Leukocyte Esterase, UA Negative     LACTIC ACID, PLASMA - Abnormal    Lactic Acid Level 10.8 (*)    B-TYPE NATRIURETIC PEPTIDE - Abnormal    Natriuretic Peptide 270.6 (*)    CBC WITH DIFFERENTIAL - Abnormal    WBC 14.78 (*)     RBC 4.09 (*)     Hgb 11.7 (*)     Hct 38.3 (*)     MCV 93.6      MCH 28.6      MCHC 30.5 (*)     RDW 14.2      Platelet 180      MPV 11.1 (*)     Neut % 87.5      Lymph % 8.6      Mono % 3.1      Eos % 0.2      Basophil % 0.1      Lymph # 1.27      Neut # 12.93 (*)     Mono # 0.46      Eos # 0.03      Baso # 0.02      IG# 0.07 (*)     IG% 0.5     URINALYSIS, MICROSCOPIC - Abnormal    Bacteria, UA Rare      Trichomonas, UA Few (*)     RBC, UA 0-2      WBC, UA 0-2      Squamous Epithelial Cells, UA Few (*)    LACTIC ACID, PLASMA - Abnormal    Lactic Acid Level 2.8 (*)    LACTIC ACID, PLASMA - Abnormal    Lactic Acid Level 2.6  (*)    POCT GLUCOSE - Abnormal    POCT Glucose 33 (*)    ISTAT PROCEDURE - Abnormal    POC PH 7.203 (*)     POC PCO2 42.2      POC PO2 240 (*)     POC HCO3 16.6 (*)     POC BE -11 (*)     POC SATURATED O2 100      POC TCO2 18 (*)     Sample ARTERIAL      Site RB      Allens Test N/A      DelSys Venti Mask     POCT GLUCOSE - Abnormal    POCT Glucose 256 (*)    DRUG SCREEN, URINE (BEAKER) - Normal    Amphetamines, Urine Negative      Barbiturates, Urine Negative      Benzodiazepine, Urine Negative      Cannabinoids, Urine Negative      Cocaine, Urine Negative      Fentanyl, Urine Negative      MDMA, Urine Negative      Opiates, Urine Negative      Phencyclidine, Urine Negative      pH, Urine 5.5      Specific Gravity, Urine Auto 1.015      Narrative:     Cut off concentrations:    Amphetamines - 1000 ng/ml  Barbiturates - 200 ng/ml  Benzodiazepine - 200 ng/ml  Cannabinoids (THC) - 50 ng/ml  Cocaine - 300 ng/ml  Fentanyl - 1.0 ng/ml  MDMA - 500 ng/ml  Opiates - 300 ng/ml   Phencyclidine (PCP) - 25 ng/ml    Specimen submitted for drug analysis and tested for pH and specific gravity in order to evaluate sample integrity. Suspect tampering if specific gravity is <1.003 and/or pH is not within the range of 4.5 - 8.0  False negatives may result form substances such as bleach added to urine.  False positives may result for the presence of a substance with similar chemical structure to the drug or its metabolite.    This test provides only a PRELIMINARY analytical test result. A more specific alternate chemical method must be used in order to obtain a confirmed analytical result. Gas chromatography/mass spectrometry (GC/MS) is the preferred confirmatory method. Other chemical confirmation methods are available. Clinical consideration and professional judgement should be applied to any drug of abuse test result, particularly when preliminary positive results are used.    Positive results will be confirmed only at the  physicians request. Unconfirmed screening results are to be used only for medical purposes (treatment).        ALCOHOL,MEDICAL (ETHANOL) - Normal    Ethanol Level <10.0     TROPONIN I - Normal    Troponin-I <0.010     MAGNESIUM - Normal    Magnesium Level 2.30     CK - Normal    Creatine Kinase 121     BLOOD CULTURE [facility]   BLOOD CULTURE [facility]   CBC W/ AUTO DIFFERENTIAL    Narrative:     The following orders were created for panel order CBC auto differential.  Procedure                               Abnormality         Status                     ---------                               -----------         ------                     CBC with Differential[[id number]]       Abnormal            Final result                 Please view results for these tests on the individual orders.   POCT GLUCOSE, HAND-HELD DEVICE     EKG Readings: (Independently Interpreted)   EKG done at 2031 on September 28, 2024 shows atrial fibrillation with rapid ventricular response right axis deviation ventricular rate is 110 beats per minute this is similar to previous EKG       Imaging Results              X-Ray Chest AP Portable (Preliminary result)  Result time 09/29/24 02:56:50      Wet Read by Ryan Whitney MD (09/29/24 02:56:50, [facility] - Emergency Dept, Emergency Medicine)    Hyperexpanded lungs bilaterally consistent with chronic emphysematous COPD otherwise no acute cardiopulmonary changes noted                                     Medications   0.9%  NaCl infusion (0 mLs Intravenous Stopped 9/29/24 0000)   dextrose 50% injection 25 g (50 mLs Intravenous Given 9/28/24 2032)   lactated ringers bolus 1,000 mL (0 mLs Intravenous Stopped 9/28/24 2220)   dextrose 5 % and 0.9 % NaCl infusion (0 mLs Intravenous Stopped 9/28/24 2257)   sodium chloride 0.9% bolus 1,089 mL 1,089 mL (0 mLs Intravenous Stopped 9/28/24 2302)   metoprolol tartrate (LOPRESSOR) tablet 50 mg (50 mg Oral Given 9/29/24 0023)   metoprolol injection 5 mg  (5 mg Intravenous Given 9/29/24 0141)     Medical Decision Making  78-year-old black male with hypoglycemia.  Differential diagnosis includes but is not limited to septic shock, pneumonia, COPD exacerbation, metastatic disease, urinary tract infection, encephalitis, pneumonia, elder abuse, starvation, medication error.  Patient was given IV fluids which also included D5 after given an amp of D50 and workup was ensued his exam was consistent with marked volume depletion.  After a L of fluids and some IV glucose patient was awake alert and oriented x4.  He really made a remarkable turnaround once his sugar came up.  This has happened before and in March he had a very similar case.  Eventually able to get urine which shows he was concentrated however there was no signs of infection.  Chest x-ray was clear just showed chronic COPD changes.  Original lactic acid was profoundly elevated and he was given 30 milliliters/kilogram body weight normal saline bolus.  His lactic acid came down tremendously after this so he was given another L and we continued the Miah Hugger to his body temperature warmed.  He had not taken any of his medications so he was given some IV and oral metoprolol in his worked well to get his heart rate down in his blood pressure markedly improved.  I offered admission at this time is 2nd repeat lactic acid shows it is down to 2.6 patient states he feels great and he actually ate a meal tonight in the emergency department and he states he wants to go home when he gets worse he will come back.  His underlying rhythm appears to be atrial fibrillation and looking back he had this back in March however with his living alone advanced age and cachexia I do not feel comfortable giving anticoagulation so recommend to him in his family follow up with his primary get referred to Cardiology for the management of this    Problems Addressed:  Atrial fibrillation, unspecified type: chronic illness or  injury  Bradycardia: acute illness or injury with systemic symptoms that poses a threat to life or bodily functions  Dehydration: acute illness or injury with systemic symptoms that poses a threat to life or bodily functions  Hypoglycemia: acute illness or injury with systemic symptoms that poses a threat to life or bodily functions  Lactic acid acidosis: acute illness or injury with systemic symptoms that poses a threat to life or bodily functions    Amount and/or Complexity of Data Reviewed  Independent Historian: caregiver and EMS  External Data Reviewed: labs, radiology, ECG and notes.  Labs: ordered. Decision-making details documented in ED Course.  Radiology: ordered and independent interpretation performed. Decision-making details documented in ED Course.  ECG/medicine tests: ordered and independent interpretation performed. Decision-making details documented in ED Course.    Risk  OTC drugs.  Prescription drug management.  Decision regarding hospitalization.  Diagnosis or treatment significantly limited by social determinants of health.  Risk Details: Risk is that he lives alone and I feel that this will happen again but patient was adamant about going home.  He was alert and oriented x4               ED Course as of 09/29/24 0303   Sat Sep 28, 2024   2144 Lab called with a critical lactic acid level of 10 therefore I have ordered a 30 milliliter/kilogram body weight normal saline bolus [PL]      ED Course User Index  [PL] Ryan Whitney MD                           Clinical Impression:  Final diagnoses:  [R00.1] Bradycardia  [E16.2] Hypoglycemia  [E87.20] Lactic acid acidosis  [E86.0] Dehydration (Primary)  [I48.91] Atrial fibrillation, unspecified type          ED Disposition Condition    Discharge Stable          ED Prescriptions    None       Follow-up Information       Follow up With Specialties Details Why Contact Info    Andre Sam NP Family Medicine In 2 days  [address]  "[place] [id number]  [phone]              Portions of this note have been created with voice recognition software. Occasional "wrong-words" or "sound alike" substitutions may have occurred due to inherent limitations of voice software. Please read the note carefully and recognize, using context, word substitutions may have occurred.       Ryan Whitney MD  09/29/24 0303    "

## 2024-10-04 LAB
BACTERIA BLD CULT: NORMAL
BACTERIA BLD CULT: NORMAL

## 2025-04-30 DIAGNOSIS — Z87.891 PERSONAL HISTORY OF TOBACCO USE, PRESENTING HAZARDS TO HEALTH: Primary | ICD-10-CM

## 2025-06-03 DIAGNOSIS — J44.9 COPD (CHRONIC OBSTRUCTIVE PULMONARY DISEASE): Primary | ICD-10-CM

## 2025-06-11 ENCOUNTER — HOSPITAL ENCOUNTER (OUTPATIENT)
Dept: RADIOLOGY | Facility: HOSPITAL | Age: 80
Discharge: HOME | End: 2025-06-11
Attending: INTERNAL MEDICINE
Payer: MEDICARE

## 2025-06-11 DIAGNOSIS — J44.9 COPD (CHRONIC OBSTRUCTIVE PULMONARY DISEASE): ICD-10-CM

## 2025-06-11 PROCEDURE — 71250 CT THORAX DX C-: CPT | Mod: TC

## 2025-06-24 DIAGNOSIS — Z72.0 TOBACCO USER: ICD-10-CM

## 2025-06-24 DIAGNOSIS — R91.8 LUNG MASS: Primary | ICD-10-CM
